# Patient Record
Sex: MALE | Race: WHITE | NOT HISPANIC OR LATINO | Employment: FULL TIME | URBAN - METROPOLITAN AREA
[De-identification: names, ages, dates, MRNs, and addresses within clinical notes are randomized per-mention and may not be internally consistent; named-entity substitution may affect disease eponyms.]

---

## 2017-12-05 ENCOUNTER — TELEPHONE (OUTPATIENT)
Dept: LAB | Facility: CLINIC | Age: 37
End: 2017-12-05

## 2017-12-05 DIAGNOSIS — Z13.6 CARDIOVASCULAR SCREENING; LDL GOAL LESS THAN 160: Primary | ICD-10-CM

## 2017-12-05 DIAGNOSIS — Z13.1 SCREENING FOR DIABETES MELLITUS: ICD-10-CM

## 2020-02-24 ENCOUNTER — HEALTH MAINTENANCE LETTER (OUTPATIENT)
Age: 40
End: 2020-02-24

## 2020-12-13 ENCOUNTER — HEALTH MAINTENANCE LETTER (OUTPATIENT)
Age: 40
End: 2020-12-13

## 2021-04-17 ENCOUNTER — HEALTH MAINTENANCE LETTER (OUTPATIENT)
Age: 41
End: 2021-04-17

## 2021-09-24 ASSESSMENT — ENCOUNTER SYMPTOMS
EYE PAIN: 0
PALPITATIONS: 0
PARESTHESIAS: 0
DYSURIA: 0
HEMATURIA: 0
NAUSEA: 0
HEARTBURN: 0
DIZZINESS: 0
FREQUENCY: 0
CHILLS: 0
FEVER: 0
NERVOUS/ANXIOUS: 0
DIARRHEA: 0
HEADACHES: 0
MYALGIAS: 0
COUGH: 0
SHORTNESS OF BREATH: 0
JOINT SWELLING: 0
CONSTIPATION: 0
WEAKNESS: 0
ARTHRALGIAS: 0
ABDOMINAL PAIN: 0
HEMATOCHEZIA: 0
SORE THROAT: 0

## 2021-09-26 ENCOUNTER — HEALTH MAINTENANCE LETTER (OUTPATIENT)
Age: 41
End: 2021-09-26

## 2021-09-30 ENCOUNTER — OFFICE VISIT (OUTPATIENT)
Dept: INTERNAL MEDICINE | Facility: CLINIC | Age: 41
End: 2021-09-30
Payer: COMMERCIAL

## 2021-09-30 VITALS
TEMPERATURE: 98.3 F | BODY MASS INDEX: 24.11 KG/M2 | DIASTOLIC BLOOD PRESSURE: 71 MMHG | RESPIRATION RATE: 18 BRPM | HEART RATE: 79 BPM | WEIGHT: 159.1 LBS | HEIGHT: 68 IN | SYSTOLIC BLOOD PRESSURE: 112 MMHG | OXYGEN SATURATION: 99 %

## 2021-09-30 DIAGNOSIS — Z00.00 ENCOUNTER FOR ROUTINE ADULT HEALTH EXAMINATION WITHOUT ABNORMAL FINDINGS: Primary | ICD-10-CM

## 2021-09-30 PROCEDURE — 99386 PREV VISIT NEW AGE 40-64: CPT | Performed by: INTERNAL MEDICINE

## 2021-09-30 RX ORDER — KRILL/OM-3/DHA/EPA/PHOSPHO/AST 500MG-86MG
CAPSULE ORAL
COMMUNITY
End: 2023-05-12

## 2021-09-30 RX ORDER — NEOMYCIN POLYMYXIN B SULFATES AND DEXAMETHASONE 3.5; 10000; 1 MG/ML; [USP'U]/ML; MG/ML
SUSPENSION/ DROPS OPHTHALMIC
COMMUNITY
End: 2023-05-12

## 2021-09-30 ASSESSMENT — ENCOUNTER SYMPTOMS
CHILLS: 0
JOINT SWELLING: 0
DYSURIA: 0
DIZZINESS: 0
PALPITATIONS: 0
EYE PAIN: 0
NERVOUS/ANXIOUS: 0
FEVER: 0
HEARTBURN: 0
FREQUENCY: 0
ARTHRALGIAS: 0
WEAKNESS: 0
DIARRHEA: 0
COUGH: 0
CONSTIPATION: 0
MYALGIAS: 0
HEMATURIA: 0
SHORTNESS OF BREATH: 0
SORE THROAT: 0
PARESTHESIAS: 0
ABDOMINAL PAIN: 0
NAUSEA: 0
HEADACHES: 0
HEMATOCHEZIA: 0

## 2021-09-30 ASSESSMENT — MIFFLIN-ST. JEOR: SCORE: 1610.14

## 2021-09-30 NOTE — PATIENT INSTRUCTIONS
PREVENTIVE HEALTH RECOMMENDATIONS:   Vaccines: Get a flu shot each year. Get a tetanus shot every 10 years.   Exercise for at least 150 minutes a week (an average of 30 minutes a day, 5 days of the week). This will help you control your weight and prevent disease.  Limit alcohol to one drink per day.  No smoking.   Wear sunscreen to prevent skin cancer.   See your dentist twice a year for an exam and cleaning.  Try to get Calcium 100 mg total per day. It is best to not take it all at once. Try to get Vitamin D at least 1000 units (25 mcg) per day.    BMI or Body Mass Index is a way of indicating weight and health risk for cardiovascular diseases, high blood pressure, diabetes.   Definitions:    Underweight is less than 18.5 and will be associated with health risk.   Normal BMI is 18.5 to 25   Overweight is 25-29   Obesity is 30 or greater   Morbid Obesity is 40 or greater or 35 or greater with diabetes, prediabetes or abnormal blood sugar, high blood pressure or elevated cholesterol  Obesity and Morbid Obesity are associated with higher health risks. Lowering calories, exercising more may lower your BMI and even small decreases can have positive impact on lowering health risks.   Your Body mass index is 24.01 kg/m ..,

## 2021-09-30 NOTE — NURSING NOTE
"/71 (BP Location: Left arm, Patient Position: Sitting, Cuff Size: Adult Regular)   Pulse 79   Temp 98.3  F (36.8  C) (Oral)   Resp 18   Ht 1.734 m (5' 8.25\")   Wt 72.2 kg (159 lb 1.6 oz)   SpO2 99%   BMI 24.01 kg/m      "

## 2021-09-30 NOTE — PROGRESS NOTES
SUBJECTIVE:   CC: Vance Atkinson is an 41 year old male who presents for preventative health visit.       Patient has been advised of split billing requirements and indicates understanding: Yes  Healthy Habits:     Getting at least 3 servings of Calcium per day:  Yes    Bi-annual eye exam:  Yes    Dental care twice a year:  Yes    Sleep apnea or symptoms of sleep apnea:  None    Diet:  Regular (no restrictions)    Frequency of exercise:  4-5 days/week    Duration of exercise:  30-45 minutes    Taking medications regularly:  No    Medication side effects:  Not applicable    PHQ-2 Total Score: 0    Additional concerns today:  No    He has not been seen for 5 years.   No acute concerns but recently diagnosed with chalazion, on antibiotic ointment.       Ability to successfully perform activities of daily living: Yes, no assistance needed  Home safety:  none identified   Hearing impairment: None        Today's PHQ-2 Score:   PHQ-2 ( 1999 [employer]) 9/24/2021   Q1: Little interest or pleasure in doing things 0   Q2: Feeling down, depressed or hopeless 0   PHQ-2 Score 0   Q1: Little interest or pleasure in doing things Not at all   Q2: Feeling down, depressed or hopeless Not at all   PHQ-2 Score 0       Abuse: Current or Past(Physical, Sexual or Emotional)- No  Do you feel safe in your environment? Yes    Have you ever done Advance Care Planning? (For example, a Health Directive, POLST, or a discussion with a medical provider or your loved ones about your wishes): No, advance care planning information given to patient to review.  Patient plans to discuss their wishes with loved ones or provider.      Social History     Tobacco Use     Smoking status: Never Smoker     Smokeless tobacco: Never Used   Substance Use Topics     Alcohol use: Yes     Comment: 5 drinks a week      If you drink alcohol do you typically have >3 drinks per day or >7 drinks per week? No    Alcohol Use 9/24/2021   Prescreen: >3 drinks/day or >7  "drinks/week? No   Prescreen: >3 drinks/day or >7 drinks/week? -   No flowsheet data found.    Last PSA: No results found for: PSA    Reviewed orders with patient. Reviewed health maintenance and updated orders accordingly - Yes      Reviewed and updated as needed this visit by clinical staff                 Patient Active Problem List   Diagnosis     CARDIOVASCULAR SCREENING; LDL GOAL LESS THAN 160     Current Outpatient Medications   Medication Sig Dispense Refill     Krill Oil 500 MG CAPS        neomycin-polymixin-dexamethasone (MAXITROL) 0.1 % ophthalmic suspension        NO ACTIVE MEDICATIONS           Reviewed and updated as needed this visit by Provider                    Review of Systems   Constitutional: Negative for chills and fever.   HENT: Negative for congestion, ear pain, hearing loss and sore throat.    Eyes: Negative for pain and visual disturbance.   Respiratory: Negative for cough and shortness of breath.    Cardiovascular: Negative for chest pain, palpitations and peripheral edema.   Gastrointestinal: Negative for abdominal pain, constipation, diarrhea, heartburn, hematochezia and nausea.   Genitourinary: Negative for discharge, dysuria, frequency, genital sores, hematuria, impotence and urgency.   Musculoskeletal: Negative for arthralgias, joint swelling and myalgias.   Skin: Negative for rash.   Neurological: Negative for dizziness, weakness, headaches and paresthesias.   Psychiatric/Behavioral: Negative for mood changes. The patient is not nervous/anxious.      Wants mole checked left upper chest near neck    OBJECTIVE:   /71 (BP Location: Left arm, Patient Position: Sitting, Cuff Size: Adult Regular)   Pulse 79   Temp 98.3  F (36.8  C) (Oral)   Resp 18   Ht 1.734 m (5' 8.25\")   Wt 72.2 kg (159 lb 1.6 oz)   SpO2 99%   BMI 24.01 kg/m      Physical Exam    HEENT: extraocular movements are intact, pupils equal and reactive to light and accommodation, TMs clear  NECK: Neck supple. No " "adenopathy. Thyroid symmetric, normal size,, Carotids without bruits.  PULMONARY: clear to auscultation  CARDIAC: S1, S2 normal, no murmur, rub or gallop, regular rate and rhythm  PULSES: 2/2 throughout  ABDOMINAL: Soft, nontender.  Normal bowel sounds.  No hepatosplenomegaly or abnormal masses  BREAST: male breasts are normal without masses  REFLEXES: 2+ throughout  SKIN benign nevus left upper chest         ASSESSMENT/PLAN:   (Z00.00) Encounter for routine adult health examination without abnormal findings  (primary encounter diagnosis)  Comment: reassured nevus benign, recommend flu shot  Plan: Basic metabolic panel  (Ca, Cl, CO2, Creat,         Gluc, K, Na, BUN), Lipid panel reflex to direct        LDL Fasting              Patient has been advised of split billing requirements and indicates understanding: Yes  COUNSELING:   Reviewed preventive health counseling, as reflected in patient instructions    Estimated body mass index is 23.55 kg/m  as calculated from the following:    Height as of 10/18/16: 1.734 m (5' 8.25\").    Weight as of 10/18/16: 70.8 kg (156 lb).         He reports that he has never smoked. He has never used smokeless tobacco.      Counseling Resources:  ATP IV Guidelines  Pooled Cohorts Equation Calculator  FRAX Risk Assessment  ICSI Preventive Guidelines  Dietary Guidelines for Americans, 2010  USDA's MyPlate  ASA Prophylaxis  Lung CA Screening    Cara Clarke MD  [facility]  "

## 2021-11-18 ENCOUNTER — LAB (OUTPATIENT)
Dept: LAB | Facility: CLINIC | Age: 41
End: 2021-11-18
Payer: COMMERCIAL

## 2021-11-18 DIAGNOSIS — Z00.00 ENCOUNTER FOR ROUTINE ADULT HEALTH EXAMINATION WITHOUT ABNORMAL FINDINGS: ICD-10-CM

## 2021-11-18 LAB
ANION GAP SERPL CALCULATED.3IONS-SCNC: 4 MMOL/L (ref 3–14)
BUN SERPL-MCNC: 9 MG/DL (ref 7–30)
CALCIUM SERPL-MCNC: 9.4 MG/DL (ref 8.5–10.1)
CHLORIDE BLD-SCNC: 107 MMOL/L (ref 94–109)
CHOLEST SERPL-MCNC: 147 MG/DL
CO2 SERPL-SCNC: 28 MMOL/L (ref 20–32)
CREAT SERPL-MCNC: 0.91 MG/DL (ref 0.66–1.25)
FASTING STATUS PATIENT QL REPORTED: YES
GFR SERPL CREATININE-BSD FRML MDRD: >90 ML/MIN/1.73M2
GLUCOSE BLD-MCNC: 88 MG/DL (ref 70–99)
HDLC SERPL-MCNC: 67 MG/DL
LDLC SERPL CALC-MCNC: 67 MG/DL
NONHDLC SERPL-MCNC: 80 MG/DL
POTASSIUM BLD-SCNC: 4.3 MMOL/L (ref 3.4–5.3)
SODIUM SERPL-SCNC: 139 MMOL/L (ref 133–144)
TRIGL SERPL-MCNC: 66 MG/DL

## 2021-11-18 PROCEDURE — 80048 BASIC METABOLIC PNL TOTAL CA: CPT

## 2021-11-18 PROCEDURE — 80061 LIPID PANEL: CPT

## 2021-11-18 PROCEDURE — 36415 COLL VENOUS BLD VENIPUNCTURE: CPT

## 2022-03-07 ENCOUNTER — E-VISIT (OUTPATIENT)
Dept: URGENT CARE | Facility: CLINIC | Age: 42
End: 2022-03-07
Payer: COMMERCIAL

## 2022-03-07 ENCOUNTER — OFFICE VISIT (OUTPATIENT)
Dept: URGENT CARE | Facility: URGENT CARE | Age: 42
End: 2022-03-07
Payer: COMMERCIAL

## 2022-03-07 VITALS
OXYGEN SATURATION: 99 % | SYSTOLIC BLOOD PRESSURE: 118 MMHG | BODY MASS INDEX: 24.27 KG/M2 | DIASTOLIC BLOOD PRESSURE: 71 MMHG | WEIGHT: 160.8 LBS | TEMPERATURE: 97.1 F | HEART RATE: 64 BPM

## 2022-03-07 DIAGNOSIS — L01.00 IMPETIGO: ICD-10-CM

## 2022-03-07 DIAGNOSIS — R21 RASH AND NONSPECIFIC SKIN ERUPTION: Primary | ICD-10-CM

## 2022-03-07 DIAGNOSIS — L30.9 DERMATITIS: Primary | ICD-10-CM

## 2022-03-07 PROCEDURE — 99213 OFFICE O/P EST LOW 20 MIN: CPT | Performed by: PHYSICIAN ASSISTANT

## 2022-03-07 PROCEDURE — 99207 PR NON-BILLABLE SERV PER CHARTING: CPT | Performed by: FAMILY MEDICINE

## 2022-03-07 RX ORDER — CEPHALEXIN 500 MG/1
500 CAPSULE ORAL 3 TIMES DAILY
Qty: 21 CAPSULE | Refills: 0 | Status: SHIPPED | OUTPATIENT
Start: 2022-03-07 | End: 2022-03-14

## 2022-03-07 RX ORDER — PREDNISONE 20 MG/1
40 TABLET ORAL DAILY
Qty: 12 TABLET | Refills: 0 | Status: SHIPPED | OUTPATIENT
Start: 2022-03-07 | End: 2022-03-13

## 2022-03-07 NOTE — PROGRESS NOTES
SUBJECTIVE:  Vance Atkinson is a 41 year old male who presents to the clinic today for a rash.  Onset of rash was 2 week(s) ago.   Patient is very active in [employer]   Rash is gradual onset and worsening.  Location of the rash: arms, stomach and neck .  Quality/symptoms of rash: little itchy and also some crusting noted    Symptoms are moderate and rash seems to be worsening.  Previous history of a similar rash? No  Recent exposure history: as above     Associated symptoms include: denies sx of fever, throat tightness, wheezing, cough, tongue/lip swelling, shortness of breath, joint pain, nausea, vomiting, rhinorrhea, sore throat and URI symptoms.        Past Medical History:   Diagnosis Date     NO ACTIVE PROBLEMS      Current Outpatient Medications   Medication Sig Dispense Refill     Krill Oil 500 MG CAPS        neomycin-polymixin-dexamethasone (MAXITROL) 0.1 % ophthalmic suspension        NO ACTIVE MEDICATIONS  (Patient not taking: Reported on 3/7/2022)       Social History     Tobacco Use     Smoking status: Never Smoker     Smokeless tobacco: Never Used   Substance Use Topics     Alcohol use: Yes     Comment: 5 drinks a week        ROS:  Review of systems negative except as stated above.    EXAM:   /71 (BP Location: Right arm, Patient Position: Sitting, Cuff Size: Adult Regular)   Pulse 64   Temp 97.1  F (36.2  C)   Wt 72.9 kg (160 lb 12.8 oz)   SpO2 99%   BMI 24.27 kg/m    GENERAL: alert, no acute distress.  SKIN: Rash description:  Location as above with patches of raised papular lesions with some crusting honey colored in nature.    GENERAL APPEARANCE: healthy, alert and no distress  EYES: EOMI,  PERRL, conjunctiva clear  HENT: ear canals and TM's normal.  Nose and mouth without ulcers, erythema or lesions  NECK: supple, non-tender to palpation, no adenopathy noted  RESP: lungs clear to auscultation - no rales, rhonchi or wheezes  CV: regular rates and rhythm, normal S1 S2, no murmur  noted    assessment/plan:  (L30.9) Dermatitis  (primary encounter diagnosis)  Comment:   Plan: predniSONE (DELTASONE) 20 MG tablet          Med as directed and hygiene discussed. OTC med for sx relief.  To Follow-up with PCP as needed    (L01.00) Impetigo  Comment:    Plan: cephALEXin (KEFLEX) 500 MG capsule  As directed for seconday infection.

## 2022-03-07 NOTE — PATIENT INSTRUCTIONS
Dear Vance Atkinson,    We are sorry you are not feeling well. Based on the responses you provided, it is recommended that you be seen in-person in urgent care so we can better evaluate your symptoms. Please click here to find the nearest urgent care location to you.   You will not be charged for this Visit. Thank you for trusting us with your care.    Rosemarie Coyle MD

## 2022-03-18 ENCOUNTER — OFFICE VISIT (OUTPATIENT)
Dept: URGENT CARE | Facility: URGENT CARE | Age: 42
End: 2022-03-18
Payer: COMMERCIAL

## 2022-03-18 VITALS
DIASTOLIC BLOOD PRESSURE: 63 MMHG | SYSTOLIC BLOOD PRESSURE: 106 MMHG | OXYGEN SATURATION: 100 % | TEMPERATURE: 98.7 F | HEART RATE: 77 BPM

## 2022-03-18 DIAGNOSIS — S00.432A CONTUSION OF AURICLE OF EAR, LEFT, INITIAL ENCOUNTER: Primary | ICD-10-CM

## 2022-03-18 PROCEDURE — 99213 OFFICE O/P EST LOW 20 MIN: CPT | Performed by: FAMILY MEDICINE

## 2022-03-18 NOTE — PATIENT INSTRUCTIONS
Place ice over the painful, swollen areas of the outer left ear for 15 minutes at a time, every 2 hours while awake.      follow up with an otolaryngologist within a week for further evaluation and treatment.      Go to the emergency room if the pain keeps worsening.

## 2022-03-18 NOTE — PROGRESS NOTES
SUBJECTIVE:   Vance Atkinson is a 41 year old male presenting with a chief complaint of pain, tenderness and swelling at the auricle of the left ear.  Onset of symptoms was one day ago.  No obvious cuts/scratches to the left ear.    Predisposing factors include Patient practices [employer] and his left ear is exposed to repeated trauma.  .    Past Medical History:   Diagnosis Date     NO ACTIVE PROBLEMS      Current Outpatient Medications   Medication Sig Dispense Refill     Krill Oil 500 MG CAPS        neomycin-polymixin-dexamethasone (MAXITROL) 0.1 % ophthalmic suspension        NO ACTIVE MEDICATIONS  (Patient not taking: Reported on 3/7/2022)       Social History     Tobacco Use     Smoking status: Never Smoker     Smokeless tobacco: Never Used   Substance Use Topics     Alcohol use: Yes     Comment: 5 drinks a week        ROS:  CONSTITUTIONAL:NEGATIVE  for fevers.   INTEGUMENTARY/SKIN:  Negative for bruising.   ENT/MOUTH:  Positive for pain at the left auricle.      OBJECTIVE:  /63   Pulse 77   Temp 98.7  F (37.1  C) (Tympanic)   SpO2 100%   GENERAL APPEARANCE: healthy, alert and no distress  HENT: left ear:  The auricle is mildly edematous.  No obvious lacerations/abrasions.  There is a boggy feel to the auricle overlying the cartilage.  No obvious ecchymosis.      ASSESSMENT:  Contusion of the auricle of the left ear.      PLAN:  Ice  Tylenol, ibuprofen  follow up with an otolaryngologist for further evaluation. I ordered this referral.     Chandrakant Edouard MD

## 2022-03-28 ENCOUNTER — TRANSFERRED RECORDS (OUTPATIENT)
Dept: HEALTH INFORMATION MANAGEMENT | Facility: CLINIC | Age: 42
End: 2022-03-28
Payer: COMMERCIAL

## 2022-06-05 ENCOUNTER — HOSPITAL ENCOUNTER (EMERGENCY)
Facility: CLINIC | Age: 42
Discharge: HOME | End: 2022-06-05
Attending: EMERGENCY MEDICINE | Admitting: EMERGENCY MEDICINE
Payer: COMMERCIAL

## 2022-06-05 VITALS
RESPIRATION RATE: 16 BRPM | DIASTOLIC BLOOD PRESSURE: 73 MMHG | SYSTOLIC BLOOD PRESSURE: 128 MMHG | HEART RATE: 61 BPM | OXYGEN SATURATION: 97 % | TEMPERATURE: 97.9 F

## 2022-06-05 DIAGNOSIS — S61.211A LACERATION OF LEFT INDEX FINGER WITHOUT FOREIGN BODY WITHOUT DAMAGE TO NAIL, INITIAL ENCOUNTER: ICD-10-CM

## 2022-06-05 PROCEDURE — 90471 IMMUNIZATION ADMIN: CPT | Performed by: EMERGENCY MEDICINE

## 2022-06-05 PROCEDURE — 99283 EMERGENCY DEPT VISIT LOW MDM: CPT | Mod: 25

## 2022-06-05 PROCEDURE — 90715 TDAP VACCINE 7 YRS/> IM: CPT | Performed by: EMERGENCY MEDICINE

## 2022-06-05 PROCEDURE — 250N000011 HC RX IP 250 OP 636: Performed by: EMERGENCY MEDICINE

## 2022-06-05 PROCEDURE — 12002 RPR S/N/AX/GEN/TRNK2.6-7.5CM: CPT

## 2022-06-05 RX ADMIN — CLOSTRIDIUM TETANI TOXOID ANTIGEN (FORMALDEHYDE INACTIVATED), CORYNEBACTERIUM DIPHTHERIAE TOXOID ANTIGEN (FORMALDEHYDE INACTIVATED), BORDETELLA PERTUSSIS TOXOID ANTIGEN (GLUTARALDEHYDE INACTIVATED), BORDETELLA PERTUSSIS FILAMENTOUS HEMAGGLUTININ ANTIGEN (FORMALDEHYDE INACTIVATED), BORDETELLA PERTUSSIS PERTACTIN ANTIGEN, AND BORDETELLA PERTUSSIS FIMBRIAE 2/3 ANTIGEN 0.5 ML: 5; 2; 2.5; 5; 3; 5 INJECTION, SUSPENSION INTRAMUSCULAR at 22:33

## 2022-06-05 ASSESSMENT — ENCOUNTER SYMPTOMS
WEAKNESS: 0
NUMBNESS: 0
WOUND: 1

## 2022-06-06 NOTE — ED PROVIDER NOTES
History   Chief Complaint:  Finger Injury     The history is provided by the patient.      Vnace Atkinson is a right-handed 42 year old male who presents with a finger injury. The patient reports that he was changing out a razor blade just prior to arrival when he accidentally cut his left index finger. No numbness or weakness in the finger. Last tetanus was in 2016.    Review of Systems   Skin: Positive for wound (left index finger).   Neurological: Negative for weakness and numbness.   All other systems reviewed and are negative.        Allergies:  No Known Allergies    Medications:  The patient is not currently taking any daily medications.     Past Medical History:     The patient denies past medical history.     Past Surgical History:    The patient denies past surgical history.      Family History:    Lymphoma, mother  Alcoholism, father  Liver cancer, father    Social History:  Presents to ED alone.     Physical Exam     Patient Vitals for the past 24 hrs:   BP Temp Temp src Pulse Resp SpO2   06/05/22 2155 -- 97.9  F (36.6  C) Oral -- -- --   06/05/22 2153 120/83 -- -- 67 16 100 %       Physical Exam  Constitutional:  Alert, attentive, GCS 15  Cardiovascular:  2+ radial and ulnar pulses to the bilateral upper extremities   Neurological:  5/5 strength to the radian, ulnar and median motor distributions;      sensation intact to light touch to the radian, ulnar and median distributions  MSK:   FROM to the left hand at the wrist and digits, including FROM to the left 2nd finger with extension and flexion at the MCP, PIP, and DIP  Skin:    Skin is warm and dry.      3.5 cm laceration to the radial and palmar aspect of the    left 2nd finger, proximal phalanx, not involving the joint; no    deep structures exposed; bleeding resolved    Emergency Department Course      Procedures     Laceration Repair      LACERATION:  A simple and superficial clean 3.5 cm laceration.    LOCATION:  Left index finger radial and  palmar aspect, proximal phalanx    FUNCTION:  Distally sensation, circulation, motor and tendon function are intact.    ANESTHESIA:  Digital block using bupivaine 0.5% total of 3 mL.    PREPARATION:  Irrigation with Normal Saline.    DEBRIDEMENT:  No debridement and wound explored, no foreign body found.    CLOSURE:  Wound was closed with One Layer.  Skin closed with 9 x 5.0 Ethilon using interrupted sutures..    Emergency Department Course:     Reviewed:  I reviewed nursing notes, vitals, past medical history, Care Everywhere and [facility].    Assessments:  2227 I obtained history and examined the patient as noted above.   2351 I performed a laceration repair as noted above.     Interventions:  2233 Adacel 0.5 mL, IM    Disposition:  The patient was discharged to home.     Impression & Plan     Medical Decision Making:  This is a very pleasant right hand dominant 42 year old male who presented with a laceration to the left finger as described above due to a razor blade accident.  The wound was carefully evaluated and explored.  The laceration was closed with ethilon. Tdap updated. There is no evidence of muscular, tendon, or bony damage with this laceration.  Possible complications (infection, scarring) were reviewed with the patient.  Follow up with primary care will be indicated for suture removal in 7-10 days.  I advised strict return precautions for pain, redness or drainage to the site, bleeding, or any other concerning symptoms.         Diagnosis:    ICD-10-CM    1. Laceration of left index finger without foreign body without damage to nail, initial encounter  S61.211A      Scribe Disclosure:  I, Sanjuanita Licona, am serving as a scribe at 10:24 PM on 6/5/2022 to document services personally performed by Quang Gifford MD based on my observations and the provider's statements to me.         Quang Gifford MD  06/05/22 0705

## 2022-06-06 NOTE — ED TRIAGE NOTES
Triage Assessment     Row Name 06/05/22 2463       Triage Assessment (Adult)    Airway WDL WDL       Respiratory WDL    Respiratory WDL WDL       Skin Circulation/Temperature WDL    Skin Circulation/Temperature WDL WDL       Cardiac WDL    Cardiac WDL WDL       Peripheral/Neurovascular WDL    Peripheral Neurovascular WDL WDL       Cognitive/Neuro/Behavioral WDL    Cognitive/Neuro/Behavioral WDL WDL

## 2022-06-06 NOTE — ED TRIAGE NOTES
Pt was changing out a razor blade slipped and sliced L index finger approx 15min ago. Bleeding controlled under dressing. Bleeding without pressure and elevation. CMS intact ABCs intact A&OX4

## 2022-06-13 ENCOUNTER — ALLIED HEALTH/NURSE VISIT (OUTPATIENT)
Dept: PEDIATRICS | Facility: CLINIC | Age: 42
End: 2022-06-13
Payer: COMMERCIAL

## 2022-06-13 DIAGNOSIS — Z48.02 VISIT FOR SUTURE REMOVAL: Primary | ICD-10-CM

## 2022-06-13 PROCEDURE — 99207 PR NO CHARGE NURSE ONLY: CPT

## 2022-06-13 NOTE — PROGRESS NOTES
Suture removal:     Date sutures applied: 6/5/         Where (setting) in which they applied:ER visit    Description:  Type: sutures  Location: left pointer finger    History:    Cause of laceration:     Accompanying Signs & Symptoms: (staff: if yes-describe)  Redness: no  Warmth: no  Drainage: no  Still bleeding: no  Fevers: no    Last tetanus shot: last tetanus booster within 10 years    Applied steri strips to the suture site.     Tawana Yadav RN on 6/13/2022 at 9:02 AM

## 2023-03-08 ENCOUNTER — E-VISIT (OUTPATIENT)
Dept: INTERNAL MEDICINE | Facility: CLINIC | Age: 43
End: 2023-03-08
Payer: COMMERCIAL

## 2023-03-08 DIAGNOSIS — N52.9 ERECTILE DYSFUNCTION, UNSPECIFIED ERECTILE DYSFUNCTION TYPE: Primary | ICD-10-CM

## 2023-03-08 PROCEDURE — 99421 OL DIG E/M SVC 5-10 MIN: CPT | Performed by: INTERNAL MEDICINE

## 2023-03-09 ENCOUNTER — OFFICE VISIT (OUTPATIENT)
Dept: FAMILY MEDICINE | Facility: CLINIC | Age: 43
End: 2023-03-09
Payer: COMMERCIAL

## 2023-03-09 VITALS
OXYGEN SATURATION: 98 % | HEART RATE: 76 BPM | BODY MASS INDEX: 23.85 KG/M2 | TEMPERATURE: 99.7 F | RESPIRATION RATE: 16 BRPM | SYSTOLIC BLOOD PRESSURE: 106 MMHG | DIASTOLIC BLOOD PRESSURE: 74 MMHG | WEIGHT: 158 LBS

## 2023-03-09 DIAGNOSIS — J02.9 SORE THROAT: Primary | ICD-10-CM

## 2023-03-09 LAB
DEPRECATED S PYO AG THROAT QL EIA: NEGATIVE
GROUP A STREP BY PCR: NOT DETECTED

## 2023-03-09 PROCEDURE — 99213 OFFICE O/P EST LOW 20 MIN: CPT | Performed by: FAMILY MEDICINE

## 2023-03-09 PROCEDURE — 87651 STREP A DNA AMP PROBE: CPT | Performed by: FAMILY MEDICINE

## 2023-03-09 RX ORDER — SILDENAFIL 50 MG/1
50 TABLET, FILM COATED ORAL DAILY PRN
Qty: 10 TABLET | Refills: 1 | Status: SHIPPED | OUTPATIENT
Start: 2023-03-09 | End: 2023-04-10

## 2023-03-10 NOTE — PROGRESS NOTES
Assessment/Plan:   Sore throat  Acute illness with headache fever, mild nasal congestion and cough and now throat pain. Mild pharyngitis on exam. RST negative. I suspect a viral illness. Consider covid testing, he can do at home.   - Streptococcus A Rapid Screen w/Reflex to PCR - Clinic Collect  - Group A Streptococcus PCR Throat Swab    I discussed red flag symptoms, return precautions to clinic/ER and follow up care with patient/parent.  Expected clinical course, symptomatic cares advised. Questions answered. Patient/parent amenable with plan.    Tylenol and ibuprofen if needed for pain.   Lozenges, gargles, cold soft foods.     Likely a viral illness, strep confirmation test is pending.   Recheck if worse or no better.     Subjective:     Vance Atkinson is a 42 year old male who presents for evaluation of throat pain. Onset of illness 3-4 days ago (Monday). He has had fever and frontal headache. Pain with swallow started today. Mild nasal congestion, occasional cough.   No N/V/D, no rash. No wheeze or shortness of breath.     No Known Allergies     Current Outpatient Medications   Medication     Krill Oil 500 MG CAPS     neomycin-polymixin-dexamethasone (MAXITROL) 0.1 % ophthalmic suspension     NO ACTIVE MEDICATIONS     sildenafil (VIAGRA) 50 MG tablet     No current facility-administered medications for this visit.     Patient Active Problem List   Diagnosis     CARDIOVASCULAR SCREENING; LDL GOAL LESS THAN 160       Objective:     /74   Pulse 76   Temp 99.7  F (37.6  C) (Oral)   Resp 16   Wt 71.7 kg (158 lb)   SpO2 98%   BMI 23.85 kg/m      Physical    General Appearance: Alert, pleasant, no distress, low grade fever, VSS  Head: Normocephalic, without obvious abnormality, atraumatic  Eyes: Conjunctivae are normal.   Ears: Normal TMs and external ear canals, both ears  Nose: Mild congestion.  Throat: Throat is red posteriorly  No exudate.  No vesicular lesions  Neck: No adenopathy  Lungs: Clear to  auscultation bilaterally, respirations unlabored  Heart: Regular rate and rhythm  Psychiatric: Patient has a normal mood and affect.         Results for orders placed or performed in visit on 03/09/23   Streptococcus A Rapid Screen w/Reflex to PCR - Clinic Collect     Status: Normal    Specimen: Throat; Swab   Result Value Ref Range    Group A Strep antigen Negative Negative   Group A Streptococcus PCR Throat Swab     Status: Normal    Specimen: Throat; Swab   Result Value Ref Range    Group A strep by PCR Not Detected Not Detected    Narrative    The Xpert Xpress Strep A test, performed on the [employer], is a rapid, qualitative in vitro diagnostic test for the detection of Streptococcus pyogenes (Group A ß-hemolytic Streptococcus, Strep A) in throat swab specimens from patients with signs and symptoms of pharyngitis. The Xpert Xpress Strep A test can be used as an aid in the diagnosis of Group A Streptococcal pharyngitis. The assay is not intended to monitor treatment for Group A Streptococcus infections. The Xpert Xpress Strep A test utilizes an automated real-time polymerase chain reaction (PCR) to detect Streptococcus pyogenes DNA.       This note has been dictated in part using voice recognition software.  Any grammatical or context distortions are unintentional and inherent to the software.  Please feel free to contact me directly for clarification if needed.

## 2023-03-10 NOTE — PATIENT INSTRUCTIONS
Tylenol and ibuprofen if needed for pain.   Lozenges, gargles, cold soft foods.     Likely a viral illness, strep confirmation test is pending.   Recheck if worse or no better.

## 2023-04-09 ENCOUNTER — MYC MEDICAL ADVICE (OUTPATIENT)
Dept: INTERNAL MEDICINE | Facility: CLINIC | Age: 43
End: 2023-04-09
Payer: COMMERCIAL

## 2023-04-09 DIAGNOSIS — N52.9 ERECTILE DYSFUNCTION, UNSPECIFIED ERECTILE DYSFUNCTION TYPE: ICD-10-CM

## 2023-04-11 RX ORDER — SILDENAFIL 50 MG/1
50 TABLET, FILM COATED ORAL DAILY PRN
Qty: 12 TABLET | Refills: 11 | Status: SHIPPED | OUTPATIENT
Start: 2023-04-11 | End: 2023-05-12

## 2023-04-23 ENCOUNTER — HEALTH MAINTENANCE LETTER (OUTPATIENT)
Age: 43
End: 2023-04-23

## 2023-05-11 ASSESSMENT — ENCOUNTER SYMPTOMS
DIZZINESS: 0
EYE PAIN: 0
DYSURIA: 0
JOINT SWELLING: 0
NAUSEA: 0
PALPITATIONS: 0
WEAKNESS: 0
NERVOUS/ANXIOUS: 0
FREQUENCY: 0
ARTHRALGIAS: 0
SHORTNESS OF BREATH: 0
HEARTBURN: 0
DIARRHEA: 0
COUGH: 0
CHILLS: 0
ABDOMINAL PAIN: 0
HEMATOCHEZIA: 0
HEMATURIA: 0
FEVER: 0
MYALGIAS: 0
SORE THROAT: 0
CONSTIPATION: 0
HEADACHES: 0
PARESTHESIAS: 0

## 2023-05-12 ENCOUNTER — OFFICE VISIT (OUTPATIENT)
Dept: INTERNAL MEDICINE | Facility: CLINIC | Age: 43
End: 2023-05-12
Payer: COMMERCIAL

## 2023-05-12 VITALS
WEIGHT: 156 LBS | HEIGHT: 68 IN | DIASTOLIC BLOOD PRESSURE: 62 MMHG | HEART RATE: 87 BPM | BODY MASS INDEX: 23.64 KG/M2 | OXYGEN SATURATION: 99 % | SYSTOLIC BLOOD PRESSURE: 106 MMHG | RESPIRATION RATE: 18 BRPM | TEMPERATURE: 98.6 F

## 2023-05-12 DIAGNOSIS — N52.9 ERECTILE DYSFUNCTION, UNSPECIFIED ERECTILE DYSFUNCTION TYPE: ICD-10-CM

## 2023-05-12 DIAGNOSIS — Z00.00 ANNUAL PHYSICAL EXAM: Primary | ICD-10-CM

## 2023-05-12 PROCEDURE — 99396 PREV VISIT EST AGE 40-64: CPT | Performed by: INTERNAL MEDICINE

## 2023-05-12 PROCEDURE — 99214 OFFICE O/P EST MOD 30 MIN: CPT | Mod: 25 | Performed by: INTERNAL MEDICINE

## 2023-05-12 RX ORDER — SILDENAFIL CITRATE 20 MG/1
20 TABLET ORAL 3 TIMES DAILY
Qty: 30 TABLET | Refills: 4 | Status: SHIPPED | OUTPATIENT
Start: 2023-05-12 | End: 2024-07-22

## 2023-05-12 ASSESSMENT — ENCOUNTER SYMPTOMS
NERVOUS/ANXIOUS: 0
HEMATOCHEZIA: 0
ARTHRALGIAS: 0
NAUSEA: 0
HEADACHES: 0
HEMATURIA: 0
SHORTNESS OF BREATH: 0
FREQUENCY: 0
HEARTBURN: 0
ABDOMINAL PAIN: 0
DIZZINESS: 0
PALPITATIONS: 0
FEVER: 0
DIARRHEA: 0
CHILLS: 0
CONSTIPATION: 0
EYE PAIN: 0
PARESTHESIAS: 0
JOINT SWELLING: 0
MYALGIAS: 0
SORE THROAT: 0
DYSURIA: 0
COUGH: 0
WEAKNESS: 0

## 2023-05-12 NOTE — PROGRESS NOTES
SUBJECTIVE:   CC: Tommie is an 43 year old who presents for preventative health visit.       5/12/2023    11:23 AM   Additional Questions   Roomed by Meliza VALENTE LPN     Patient has been advised of split billing requirements and indicates understanding: Yes  Patient is a 43-year-old  male who presents to the clinic for his annual physical.  His only acute concern today is in regards to his sildenafil prescription.  Patient was started on sildenafil 50 mg tablets approximately 2 to 3 months ago for management of erectile dysfunction.  Patient typically takes half of the tablet prior to sexual activity.  He has found the medication to be quite effective, but he is wondering if he can get a prescription for a smaller tablet so that he does not have to cut the tablets.  Patient states that he was told by the pharmacist that there are 20 mg tablets available, and he is wondering if he can get a prescription for that dosage.  Patient is tolerating medication without issue.  Otherwise, he is doing quite well.  Patient reports stable appetite.  He is stooling and voiding without issue.  Patient does exercise multiple times a week as he is involved in [employer].    Healthy Habits:     Getting at least 3 servings of Calcium per day:  Yes    Bi-annual eye exam:  Yes    Dental care twice a year:  Yes    Sleep apnea or symptoms of sleep apnea:  None    Diet:  Regular (no restrictions)    Frequency of exercise:  4-5 days/week    Duration of exercise:  45-60 minutes    Taking medications regularly:  Yes    Medication side effects:  None    PHQ-2 Total Score: 0    Additional concerns today:  No      Today's PHQ-2 Score:       5/11/2023     6:29 PM   PHQ-2 ( 1999 Pfizer)   Q1: Little interest or pleasure in doing things 0   Q2: Feeling down, depressed or hopeless 0   PHQ-2 Score 0   Q1: Little interest or pleasure in doing things Not at all   Q2: Feeling down, depressed or hopeless Not at all   PHQ-2 Score 0           Social  "History     Tobacco Use     Smoking status: Never     Smokeless tobacco: Never   Vaping Use     Vaping status: Not on file   Substance Use Topics     Alcohol use: Yes     Comment: 5 drinks a week              5/11/2023     6:29 PM   Alcohol Use   Prescreen: >3 drinks/day or >7 drinks/week? No       Last PSA: No results found for: PSA    Reviewed orders with patient. Reviewed health maintenance and updated orders accordingly - Yes  Labs reviewed in EPIC    Reviewed and updated as needed this visit by clinical staff                  Reviewed and updated as needed this visit by Provider                     Review of Systems   Constitutional: Negative for chills and fever.   HENT: Negative for congestion, ear pain, hearing loss and sore throat.    Eyes: Negative for pain and visual disturbance.   Respiratory: Negative for cough and shortness of breath.    Cardiovascular: Negative for chest pain, palpitations and peripheral edema.   Gastrointestinal: Negative for abdominal pain, constipation, diarrhea, heartburn, hematochezia and nausea.   Genitourinary: Positive for impotence. Negative for dysuria, frequency, genital sores, hematuria, penile discharge and urgency.   Musculoskeletal: Negative for arthralgias, joint swelling and myalgias.   Skin: Negative for rash.   Neurological: Negative for dizziness, weakness, headaches and paresthesias.   Psychiatric/Behavioral: Negative for mood changes. The patient is not nervous/anxious.          OBJECTIVE:   Blood pressure 106/62, pulse 87, temperature 98.6  F (37  C), temperature source Oral, resp. rate 18, height 1.734 m (5' 8.25\"), weight 70.8 kg (156 lb), SpO2 99 %.      Physical Exam  Vitals reviewed.   Constitutional:       Appearance: Normal appearance.   HENT:      Head: Normocephalic and atraumatic.      Right Ear: Tympanic membrane, ear canal and external ear normal.      Left Ear: Tympanic membrane, ear canal and external ear normal.      Mouth/Throat:      Mouth: " Mucous membranes are moist.      Pharynx: Oropharynx is clear.   Eyes:      Extraocular Movements: Extraocular movements intact.      Conjunctiva/sclera: Conjunctivae normal.      Pupils: Pupils are equal, round, and reactive to light.   Cardiovascular:      Rate and Rhythm: Normal rate and regular rhythm.      Pulses: Normal pulses.      Heart sounds: Normal heart sounds.   Pulmonary:      Effort: Pulmonary effort is normal.      Breath sounds: Normal breath sounds.   Abdominal:      General: Abdomen is flat. Bowel sounds are normal.      Palpations: Abdomen is soft.   Musculoskeletal:         General: Normal range of motion.      Cervical back: Normal range of motion and neck supple.   Skin:     General: Skin is warm and dry.      Capillary Refill: Capillary refill takes less than 2 seconds.   Neurological:      General: No focal deficit present.      Mental Status: He is alert and oriented to person, place, and time.           Diagnostic Test Results:  Labs reviewed in Epic    ASSESSMENT/PLAN:   (Z00.00) Annual physical exam  (primary encounter diagnosis)  Comment: At this time, patient does have an unremarkable physical examination.  His blood pressure is noted to be at an acceptable level.  We did spend some time discussing appropriate dietary lifestyle modifications necessary to keep his weight and blood pressure under good control.  Patient is not due for fasting lab work at this time.  All health maintenance items were addressed.    (N52.9) Erectile dysfunction, unspecified erectile dysfunction type  Comment: Patient has had a good response to the use of sildenafil for management of his erectile dysfunction issues.  He did request a prescription for a 20 mg tablet so that he does not have to try to split tablets.  A prescription for sildenafil 20 mg by mouth as needed for sexual activity was submitted to his pharmacy.  Side effects of phosphodiesterase inhibitor use were reviewed.  Patient had no further  questions or concerns in this regard.      Patient has been advised of split billing requirements and indicates understanding: Yes      COUNSELING:   Reviewed preventive health counseling, as reflected in patient instructions        He reports that he has never smoked. He has never used smokeless tobacco.        Bhaskar Dick MD  [facility]

## 2023-05-12 NOTE — NURSING NOTE
"Chief Complaint   Patient presents with     Physical     initial /62   Pulse 87   Temp 98.6  F (37  C) (Oral)   Resp 18   Ht 1.734 m (5' 8.25\")   Wt 70.8 kg (156 lb)   SpO2 99%   BMI 23.55 kg/m   Estimated body mass index is 23.55 kg/m  as calculated from the following:    Height as of this encounter: 1.734 m (5' 8.25\").    Weight as of this encounter: 70.8 kg (156 lb)..  bp completed using cuff size large  CHENG PINK LPN  "

## 2023-05-26 ENCOUNTER — TELEPHONE (OUTPATIENT)
Dept: INTERNAL MEDICINE | Facility: CLINIC | Age: 43
End: 2023-05-26
Payer: COMMERCIAL

## 2023-05-26 NOTE — TELEPHONE ENCOUNTER
Prior Authorization Retail Medication Request    Medication/Dose: sildenafil (REVATIO) 20 MG tablet  ICD code (if different than what is on RX):    Previously Tried and Failed:    Rationale:  Per pharmacy, patient did have PA approved for 50 mg tablets, prescription changed to 20 mg tablets.     Insurance Name:  [employer]  Insurance ID:  [id number]      Pharmacy Information (if different than what is on RX)  Name:    Phone:      Jenny Rolon RN  [facility]

## 2023-06-02 NOTE — TELEPHONE ENCOUNTER
Patient's insurance ended 5/31. He does not have active insurance at this time. Disregarding PA request.    Thank you,    Yasmin Gonzales  Oncology Pharmacy Liaison [facility] [email or web address]  Phone: [phone]  Fax: [phone]

## 2024-06-30 ENCOUNTER — HEALTH MAINTENANCE LETTER (OUTPATIENT)
Age: 44
End: 2024-06-30

## 2024-07-19 DIAGNOSIS — N52.9 ERECTILE DYSFUNCTION, UNSPECIFIED ERECTILE DYSFUNCTION TYPE: ICD-10-CM

## 2024-07-22 RX ORDER — SILDENAFIL CITRATE 20 MG/1
20 TABLET ORAL 3 TIMES DAILY PRN
Qty: 30 TABLET | Refills: 0 | Status: SHIPPED | OUTPATIENT
Start: 2024-07-22

## 2024-09-23 ENCOUNTER — MYC REFILL (OUTPATIENT)
Dept: INTERNAL MEDICINE | Facility: CLINIC | Age: 44
End: 2024-09-23
Payer: COMMERCIAL

## 2024-09-23 DIAGNOSIS — N52.9 ERECTILE DYSFUNCTION, UNSPECIFIED ERECTILE DYSFUNCTION TYPE: ICD-10-CM

## 2024-09-23 RX ORDER — SILDENAFIL CITRATE 20 MG/1
20 TABLET ORAL 3 TIMES DAILY PRN
Qty: 30 TABLET | Refills: 0 | OUTPATIENT
Start: 2024-09-23

## 2024-10-03 ENCOUNTER — MYC MEDICAL ADVICE (OUTPATIENT)
Dept: INTERNAL MEDICINE | Facility: CLINIC | Age: 44
End: 2024-10-03
Payer: COMMERCIAL

## 2024-10-03 DIAGNOSIS — N52.9 ERECTILE DYSFUNCTION, UNSPECIFIED ERECTILE DYSFUNCTION TYPE: ICD-10-CM

## 2024-10-03 RX ORDER — SILDENAFIL CITRATE 20 MG/1
20 TABLET ORAL 3 TIMES DAILY PRN
Qty: 30 TABLET | Refills: 0 | Status: CANCELLED | OUTPATIENT
Start: 2024-10-03

## 2024-10-03 NOTE — TELEPHONE ENCOUNTER
Per patient's [employer] message below, asking for refill on Sildenafil.  Last fill 7/22/24 #30 tabs.

## 2024-10-18 SDOH — HEALTH STABILITY: PHYSICAL HEALTH: ON AVERAGE, HOW MANY MINUTES DO YOU ENGAGE IN EXERCISE AT THIS LEVEL?: 40 MIN

## 2024-10-18 SDOH — HEALTH STABILITY: PHYSICAL HEALTH: ON AVERAGE, HOW MANY DAYS PER WEEK DO YOU ENGAGE IN MODERATE TO STRENUOUS EXERCISE (LIKE A BRISK WALK)?: 2 DAYS

## 2024-10-18 ASSESSMENT — SOCIAL DETERMINANTS OF HEALTH (SDOH): HOW OFTEN DO YOU GET TOGETHER WITH FRIENDS OR RELATIVES?: ONCE A WEEK

## 2024-10-22 ENCOUNTER — OFFICE VISIT (OUTPATIENT)
Dept: FAMILY MEDICINE | Facility: CLINIC | Age: 44
End: 2024-10-22
Payer: COMMERCIAL

## 2024-10-22 VITALS
RESPIRATION RATE: 17 BRPM | DIASTOLIC BLOOD PRESSURE: 77 MMHG | TEMPERATURE: 97.8 F | HEART RATE: 66 BPM | BODY MASS INDEX: 26.07 KG/M2 | HEIGHT: 68 IN | SYSTOLIC BLOOD PRESSURE: 122 MMHG | WEIGHT: 172 LBS | OXYGEN SATURATION: 99 %

## 2024-10-22 DIAGNOSIS — N52.9 ERECTILE DYSFUNCTION, UNSPECIFIED ERECTILE DYSFUNCTION TYPE: ICD-10-CM

## 2024-10-22 DIAGNOSIS — Z00.00 PREVENTATIVE HEALTH CARE: Primary | ICD-10-CM

## 2024-10-22 LAB
ALBUMIN SERPL BCG-MCNC: 4.8 G/DL (ref 3.5–5.2)
ALP SERPL-CCNC: 67 U/L (ref 40–150)
ALT SERPL W P-5'-P-CCNC: 24 U/L (ref 0–70)
ANION GAP SERPL CALCULATED.3IONS-SCNC: 10 MMOL/L (ref 7–15)
AST SERPL W P-5'-P-CCNC: 39 U/L (ref 0–45)
BILIRUB SERPL-MCNC: 0.8 MG/DL
BUN SERPL-MCNC: 15 MG/DL (ref 6–20)
CALCIUM SERPL-MCNC: 10.2 MG/DL (ref 8.8–10.4)
CHLORIDE SERPL-SCNC: 103 MMOL/L (ref 98–107)
CHOLEST SERPL-MCNC: 169 MG/DL
CREAT SERPL-MCNC: 1.01 MG/DL (ref 0.67–1.17)
EGFRCR SERPLBLD CKD-EPI 2021: >90 ML/MIN/1.73M2
FASTING STATUS PATIENT QL REPORTED: NO
FASTING STATUS PATIENT QL REPORTED: NO
GLUCOSE SERPL-MCNC: 88 MG/DL (ref 70–99)
HCO3 SERPL-SCNC: 28 MMOL/L (ref 22–29)
HDLC SERPL-MCNC: 55 MG/DL
LDLC SERPL CALC-MCNC: 92 MG/DL
NONHDLC SERPL-MCNC: 114 MG/DL
POTASSIUM SERPL-SCNC: 4.6 MMOL/L (ref 3.4–5.3)
PROT SERPL-MCNC: 7.6 G/DL (ref 6.4–8.3)
SODIUM SERPL-SCNC: 141 MMOL/L (ref 135–145)
TRIGL SERPL-MCNC: 109 MG/DL

## 2024-10-22 PROCEDURE — 80061 LIPID PANEL: CPT | Performed by: FAMILY MEDICINE

## 2024-10-22 PROCEDURE — 99396 PREV VISIT EST AGE 40-64: CPT | Performed by: FAMILY MEDICINE

## 2024-10-22 PROCEDURE — 36415 COLL VENOUS BLD VENIPUNCTURE: CPT | Performed by: FAMILY MEDICINE

## 2024-10-22 PROCEDURE — 80053 COMPREHEN METABOLIC PANEL: CPT | Performed by: FAMILY MEDICINE

## 2024-10-22 RX ORDER — SILDENAFIL 100 MG/1
100 TABLET, FILM COATED ORAL DAILY PRN
Qty: 12 TABLET | Refills: 11 | Status: SHIPPED | OUTPATIENT
Start: 2024-10-22

## 2024-10-22 NOTE — PROGRESS NOTES
Preventive Care Visit  [facility]  Edgar Snow MD, Family Medicine  Oct 22, 2024      Assessment and Plan    (Z00.00) Preventative health care  (primary encounter diagnosis)  Comment:   Plan: Comprehensive metabolic panel (BMP + Alb, Alk         Phos, ALT, AST, Total. Bili, TP), Lipid panel         reflex to direct LDL Non-fasting            (N52.9) Erectile dysfunction, unspecified erectile dysfunction type  Comment:   Plan: sildenafil (VIAGRA) 100 MG tablet              RTC in 1y    Edgar Snow MD     Mildred Reilly is a 44 year old, presenting for the following:  Physical        10/22/2024     9:08 AM   Additional Questions   Roomed by Jessie WESTON   Accompanied by self        Health Care Directive  Patient does not have a Health Care Directive or Living Will: Discussed advance care planning with patient; however, patient declined at this time.    HPI    Feeling well, no specific concerns.          10/18/2024   General Health   How would you rate your overall physical health? Good   Feel stress (tense, anxious, or unable to sleep) To some extent      (!) STRESS CONCERN      10/18/2024   Nutrition   Three or more servings of calcium each day? Yes   Diet: Regular (no restrictions)   How many servings of fruit and vegetables per day? 4 or more   How many sweetened beverages each day? 0-1            10/18/2024   Exercise   Days per week of moderate/strenous exercise 2 days   Average minutes spent exercising at this level 40 min      (!) EXERCISE CONCERN      10/18/2024   Social Factors   Frequency of gathering with friends or relatives Once a week   Worry food won't last until get money to buy more No   Food not last or not have enough money for food? No   Do you have housing? (Housing is defined as stable permanent housing and does not include staying ouside in a car, in a tent, in an abandoned building, in an overnight shelter, or couch-surfing.) Yes   Are you worried about losing  "your housing? No   Lack of transportation? No   Unable to get utilities (heat,electricity)? No            10/18/2024   Dental   Dentist two times every year? Yes            10/18/2024   TB Screening   Were you born outside of the [place]? No            Today's PHQ-2 Score:       10/21/2024     9:44 AM   PHQ-2 ( 1999 Pfizer)   Q1: Little interest or pleasure in doing things 0   Q2: Feeling down, depressed or hopeless 1   PHQ-2 Score 1   Q1: Little interest or pleasure in doing things Not at all   Q2: Feeling down, depressed or hopeless Several days   PHQ-2 Score 1           10/18/2024   Substance Use   Alcohol more than 3/day or more than 7/wk No   Do you use any other substances recreationally? No        Social History     Tobacco Use    Smoking status: Never    Smokeless tobacco: Never   Vaping Use    Vaping status: Never Used   Substance Use Topics    Alcohol use: Yes     Comment: 5 drinks a week     Drug use: No           10/18/2024   STI Screening   New sexual partner(s) since last STI/HIV test? (!) DECLINE      ASCVD Risk   The 10-year ASCVD risk score (Gay LEON, et al., 2019) is: 0.6%    Values used to calculate the score:      Age: 44 years      Sex: Male      Is Non- : No      Diabetic: No      Tobacco smoker: No      Systolic Blood Pressure: 122 mmHg      Is BP treated: No      HDL Cholesterol: 67 mg/dL      Total Cholesterol: 147 mg/dL        10/18/2024   Contraception/Family Planning   Questions about contraception or family planning No           Reviewed and updated as needed this visit by Provider                             Objective    Exam  /77 (BP Location: Right arm, Patient Position: Sitting, Cuff Size: Adult Large)   Pulse 66   Temp 97.8  F (36.6  C) (Oral)   Resp 17   Ht 1.727 m (5' 8\")   Wt 78 kg (172 lb)   SpO2 99%   BMI 26.15 kg/m     Estimated body mass index is 26.15 kg/m  as calculated from the following:    Height as of this encounter: 1.727 m (5' " "8\").    Weight as of this encounter: 78 kg (172 lb).    Physical Exam  Vitals and nursing note reviewed.   Constitutional:       Appearance: He is well-developed.   HENT:      Head: Normocephalic and atraumatic.      Right Ear: Tympanic membrane, ear canal and external ear normal.      Left Ear: Tympanic membrane, ear canal and external ear normal.   Eyes:      Pupils: Pupils are equal, round, and reactive to light.   Neck:      Thyroid: No thyromegaly.   Cardiovascular:      Rate and Rhythm: Normal rate and regular rhythm.      Heart sounds: Normal heart sounds.   Pulmonary:      Effort: Pulmonary effort is normal.      Breath sounds: Normal breath sounds.   Abdominal:      General: Bowel sounds are normal.      Palpations: Abdomen is soft. There is no mass.   Musculoskeletal:         General: Normal range of motion.   Lymphadenopathy:      Cervical: No cervical adenopathy.   Skin:     General: Skin is warm and dry.      Findings: No rash.   Neurological:      Mental Status: He is alert and oriented to person, place, and time.   Psychiatric:         Judgment: Judgment normal.         Signed Electronically by: Edgar Snow MD    "

## 2025-03-26 ENCOUNTER — VIRTUAL VISIT (OUTPATIENT)
Dept: URGENT CARE | Facility: CLINIC | Age: 45
End: 2025-03-26
Payer: COMMERCIAL

## 2025-03-26 DIAGNOSIS — R21 RASH: ICD-10-CM

## 2025-03-26 DIAGNOSIS — B35.0 TINEA BARBAE: Primary | ICD-10-CM

## 2025-03-26 PROCEDURE — 98005 SYNCH AUDIO-VIDEO EST LOW 20: CPT

## 2025-03-26 RX ORDER — TERBINAFINE HYDROCHLORIDE 250 MG/1
250 TABLET ORAL DAILY
Qty: 14 TABLET | Refills: 0 | Status: SHIPPED | OUTPATIENT
Start: 2025-03-26 | End: 2025-04-09

## 2025-03-26 NOTE — PATIENT INSTRUCTIONS
"Tinea Barbae          Pink, scaly, and crusted papules and plaques, some with superimposed pustules, in the beard area.    Overview  Tinea barbae, commonly called beard ringworm or patton's itch, is a fungal infection of the skin, hair, and hair follicles of the beard and mustache area. Beard ringworm may be passed on by direct contact with infected people or animals, with contaminated objects, or from the soil.    Although beard ringworm is most common in men, it may also affect women who have dark, coarse hair on their faces and necks.    Sometimes severe cases of beard ringworm can result in a kerion (discussed separately), which appears as a thick, \"boggy\" or \"mushy\" nodule (a solid, raised bump that is firm to the touch) or plaque (a raised or bumpy area larger than a thumbnail) on the skin.    Who s At Risk  Osorio ringworm is seen almost exclusively in older teens and adult males.    Beard ringworm is more common in warm, humid climates. 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    Signs & Symptoms  The most common locations for beard ringworm infection include the:  Chin.  Cheeks.  Neck.  Upper lip.  Beard ringworm may affect the outer surface of the skin and the hair follicles. It appears as a scaly plaque that may be small or large. In lighter skin colors, the border can be pink or red, whereas in darker skin colors, it can be dark red, purple, brown, or grayish. The central area may appear as normal skin; in other words, there is no skin color change in the center. Alternatively, small pustules (pus-filled bumps) may be seen around hair follicles in the affected skin. In the case of a kerion, you may see firm red nodules covered with pustules or scabs that may ooze blood and pus.    Osorio ringworm is usually itchy. A kerion may be accompanied by fever and swollen lymph glands.    Self-Care Guidelines  It is " extremely difficult to cure beard ringworm with only over-the-counter medications; it usually requires oral prescription medications. However, if the infection has just started, you can try an over-the-counter antifungal cream or lotion for up to 2 weeks, such as:  Terbinafine.  Clotrimazole.  Miconazole.  Apply the cream to each lesion and to the skin 2 cm beyond the border of the affected area.  Warm compresses can help drain pus and remove crusting on the lesions.    Stop shaving the affected area until you start treatment. Or if you must shave, use a new disposable razor each time you shave.    People often have tinea infections on more than one body part, so examine yourself for other ringworm infections, such as in the groin (tinea cruris, or jock itch), on the feet (tinea pedis, or athlete's foot), and anywhere else on the body (tinea corporis).    Because ringworm is very contagious, avoid contact sports until lesions have been treated for at least 48 hours. Do not share towels, clothing, or other personal items with others until the lesions have cleared.    Have any household pets or farm animals evaluated by a  to make sure they do not have a fungal infection. If the  discovers an infection, be sure to have the animal(s) treated.    When to Seek Medical Care  If the lesions do not improve after 1 or 2 weeks of applying over-the-counter antifungal creams, see your medical professional for an evaluation.    If the affected areas are deep and tender or if you have a fever or swollen lymph glands, see your medical professional as soon as possible.    Treatments  To confirm the diagnosis of beard ringworm, your medical professional may scrape some surface skin material (scales) or pluck an affected hair and place it onto a glass slide for examination under a microscope. This procedure, called a KOH (potassium hydroxide) preparation, allows the medical professional to look for signs of a  fungal infection.    If you have many pus-filled lesions or if deeper lesions are present, your medical professional may want to perform a fungal culture to determine the particular organism causing the infection.    Osorio ringworm usually requires oral antifungal pills to get rid of the infection completely, so your medical professional will likely prescribe one of the following oral medications:  Terbinafine  Ultramicrosize griseofulvin  Itraconazole  Fluconazole    Beard ringworm should go away within 4-6 weeks after using effective treatment.

## 2025-03-26 NOTE — PROGRESS NOTES
Vance is a 44 year old male who presents for a billable video visit.    ASSESSMENT/PLAN:    ICD-10-CM    1. Tinea barbae  B35.0 terbinafine (LAMISIL) 250 MG tablet      2. Rash  R21 terbinafine (LAMISIL) 250 MG tablet          Rash looks to be consistent with tinea barbae and will treat with Lamisil 250 mg daily for the next 2 weeks.  Recent CMP 10/2024 with normal liver enzymes.    Red flags that warrant emergent evaluation discussed.     Follow up with primary care provider with any problems, questions or concerns or if symptoms worsen or fail to improve. Patient verbalized understanding and is agreeable to plan.       SUBJECTIVE:  Vance Atkinson is a 44 year old who presents for a rash.  Onset of rash was 3-4 week(s) ago.  Patient reports that he does practice [employer] and is unclear if this is related.  Rash is still present.   Location of the rash: beard area on right side of his face and neck  Quality/symptoms of rash: red and scaly  Associated symptoms include: nothing.  Symptoms are moderate and rash seems to be stable.  Previous history of a similar rash? Yes: With use of Lotrimin cream that was helpful      Patient denies fever/chills, difficulty breathing, throat/tongue swelling, new meds, pets, foods, soaps, detergents, lotions, or enviornmental contacts.    Review of Systems  All systems reviewed and negative except per HPI.      OBJECTIVE:  Vitals not done due to this being a virtual visit    GENERAL: alert and no distress  EYES: Eyes grossly normal to inspection.  No discharge or erythema, or obvious scleral/conjunctival abnormalities.  RESP: No audible wheeze, cough, or visible cyanosis.    SKIN: Faint pink scaly plaques located on right beard and neck.  No erythema or drainage present.   PSYCH: Appropriate affect, tone, and pace of words    Video-Visit Details    Type of service:  Video Visit  Video Start Time: 6:36 PM  Video End Time: 6:49 PM    Originating Location (pt. Location):  Home    Distant Location (provider location):  [facility]     Platform used for Video Visit: [employer]

## (undated) RX ORDER — BUPIVACAINE HYDROCHLORIDE 5 MG/ML
INJECTION, SOLUTION PERINEURAL
Status: DISPENSED
Start: 2022-06-05